# Patient Record
Sex: FEMALE | Race: WHITE | NOT HISPANIC OR LATINO | Employment: UNEMPLOYED | URBAN - METROPOLITAN AREA
[De-identification: names, ages, dates, MRNs, and addresses within clinical notes are randomized per-mention and may not be internally consistent; named-entity substitution may affect disease eponyms.]

---

## 2013-09-20 LAB — HM MAMMOGRAM: NORMAL

## 2013-12-09 LAB — HM PAP SMEAR: NEGATIVE

## 2018-01-18 ENCOUNTER — OFFICE VISIT (OUTPATIENT)
Dept: OBSTETRICS AND GYNECOLOGY | Age: 63
End: 2018-01-18

## 2018-01-18 VITALS
BODY MASS INDEX: 21.85 KG/M2 | WEIGHT: 128 LBS | SYSTOLIC BLOOD PRESSURE: 130 MMHG | HEIGHT: 64 IN | DIASTOLIC BLOOD PRESSURE: 80 MMHG

## 2018-01-18 DIAGNOSIS — Z01.419 WELL WOMAN EXAM WITH ROUTINE GYNECOLOGICAL EXAM: Primary | ICD-10-CM

## 2018-01-18 PROCEDURE — 99386 PREV VISIT NEW AGE 40-64: CPT | Performed by: NURSE PRACTITIONER

## 2018-01-18 RX ORDER — RALOXIFENE HYDROCHLORIDE 60 MG/1
60 TABLET, FILM COATED ORAL
COMMUNITY
End: 2018-01-18

## 2018-01-18 RX ORDER — INDOMETHACIN 25 MG/1
75 CAPSULE ORAL
COMMUNITY
Start: 2017-05-19 | End: 2018-01-18

## 2018-01-18 NOTE — PROGRESS NOTES
[facility]  Routine Annual Visit    2018    Patient: Shelley Woodard          MR#:[id number]      History of Present Illness    62 y.o. female  who presents for annual exam. Last annual with Dr. Ramirez . Pap negative. Pt is postmenopausal- no bleeding. No HRT. She reports negative mammogram this past fall- we have requested the results. Reports colonoscopy at age 52 and is planning her repeat this year. Shelley has no complaints today. Recently started cross fit and is loving it.    No LMP recorded (lmp unknown). Patient is postmenopausal.  Obstetric History:  OB History      Para Term  AB Living    3 3 3   3    SAB TAB Ectopic Multiple Live Births        3         Menstrual History:     No LMP recorded (lmp unknown). Patient is postmenopausal.       Sexual History:       ________________________________________  There is no problem list on file for this patient.      Past Medical History:   Diagnosis Date   • Hx of osteopenia    • Persistent headaches    • Postmenopausal        Past Surgical History:   Procedure Laterality Date   • BREAST LUMPECTOMY Left     BENIGN   • TUBAL ABDOMINAL LIGATION  1990    DR. PEERZ/[facility] (POSTPARTUM)       History   Smoking Status   • Never Smoker   Smokeless Tobacco   • Never Used       Family History   Problem Relation Age of Onset   • Uterine cancer Mother 70     Hysterectomy, Radiation.   • Stroke Mother 82   • Hypertension Mother    • Hyperlipidemia Mother    • Hypertension Father    • Osteoporosis Sister 50   • Hypertension Sister    • Breast cancer Maternal Aunt 60   • Hypertension Brother        Prior to Admission medications    Medication Sig Start Date End Date Taking? Authorizing Provider   Calcium Citrate-Vitamin D (CALCIUM + D PO) Take  by mouth.   Yes Historical Provider, MD   indomethacin (INDOCIN) 25 MG capsule Take 75 mg by mouth. 17 Yes Historical Provider, MD   raloxifene (EVISTA) 60 MG tablet Take 60 mg  "by mouth.  1/18/18  Historical Provider, MD     ________________________________________    Current contraception: post menopausal status  History of abnormal Pap smear: no  Family history of uterine or ovarian cancer: yes - mother- 70s  Family History of colon cancer/colon polyps: no  History of abnormal mammogram: no  History of abnormal lipids: no    The following portions of the patient's history were reviewed and updated as appropriate: allergies, current medications, past family history, past medical history, past social history, past surgical history and problem list.    Review of Systems   Constitutional: Negative.    HENT: Negative.    Eyes: Negative for visual disturbance.   Respiratory: Negative for cough, shortness of breath and wheezing.    Cardiovascular: Negative for chest pain, palpitations and leg swelling.   Gastrointestinal: Negative for abdominal distention, abdominal pain, blood in stool, constipation, diarrhea, nausea and vomiting.   Endocrine: Negative for cold intolerance and heat intolerance.   Genitourinary: Negative for difficulty urinating, dyspareunia, dysuria, frequency, genital sores, hematuria, menstrual problem, pelvic pain, urgency, vaginal bleeding, vaginal discharge and vaginal pain.   Musculoskeletal: Negative.    Skin: Negative.    Neurological: Negative for dizziness, weakness, light-headedness, numbness and headaches.   Hematological: Negative.    Psychiatric/Behavioral: Negative.    Breasts: negative for lumps skin changes, dimpling, swelling, nipple changes/discharge bilaterally         Objective   Physical Exam    /80  Ht 162.6 cm (64\")  Wt 58.1 kg (128 lb)  LMP  (LMP Unknown)  Breastfeeding? No  BMI 21.97 kg/m2   BP Readings from Last 3 Encounters:   01/18/18 130/80      Wt Readings from Last 3 Encounters:   01/18/18 58.1 kg (128 lb)        BMI: Estimated body mass index is 21.97 kg/(m^2) as calculated from the following:    Height as of this encounter: 162.6 cm " "(64\").    Weight as of this encounter: 58.1 kg (128 lb).      General:   alert, appears stated age and cooperative   Heart: regular rate and rhythm, S1, S2 normal, no murmur, click, rub or gallop   Lungs: clear to auscultation bilaterally   Abdomen: soft, non-tender, without masses or organomegaly   Breast: inspection negative, no nipple discharge or bleeding, no masses or nodularity palpable   Vulva: normal   Vagina: normal mucosa, normal discharge   Cervix: no cervical motion tenderness and no lesions   Uterus: normal size, mobile, non-tender or normal shape and consistency   Adnexa: no mass, fullness, tenderness     Assessment:    1. Normal annual exam    2. Healthy lifestyle modifications discussed, counseled on self breast exams and bone health      Plan:    []  Rx:   []  Mammogram request made  [x]  PAP done  []  Occult fecal blood test ([facility])  []  Labs:   []  GC/Chl/TV  []  DEXA scan   []  Referral for colonoscopy:           HERI Cool  1/18/2018 11:22 AM  "

## 2018-01-21 LAB
CYTOLOGIST CVX/VAG CYTO: NORMAL
CYTOLOGY CVX/VAG DOC THIN PREP: NORMAL
DX ICD CODE: NORMAL
HIV 1 & 2 AB SER-IMP: NORMAL
HPV I/H RISK 4 DNA CVX QL PROBE+SIG AMP: NEGATIVE
Lab: NORMAL
OTHER STN SPEC: NORMAL
PATH REPORT.FINAL DX SPEC: NORMAL
STAT OF ADQ CVX/VAG CYTO-IMP: NORMAL

## 2018-01-24 ENCOUNTER — TELEPHONE (OUTPATIENT)
Dept: OBSTETRICS AND GYNECOLOGY | Age: 63
End: 2018-01-24

## 2018-01-24 NOTE — TELEPHONE ENCOUNTER
----- Message from HERI Doe sent at 1/22/2018  1:13 PM EST -----  Please inform patient her pap smear returned negative (normal). Thank you.

## 2019-01-23 ENCOUNTER — OFFICE VISIT (OUTPATIENT)
Dept: OBSTETRICS AND GYNECOLOGY | Age: 64
End: 2019-01-23

## 2019-01-23 VITALS
BODY MASS INDEX: 20.93 KG/M2 | WEIGHT: 122.6 LBS | SYSTOLIC BLOOD PRESSURE: 110 MMHG | HEIGHT: 64 IN | DIASTOLIC BLOOD PRESSURE: 70 MMHG

## 2019-01-23 DIAGNOSIS — Z01.419 WELL WOMAN EXAM WITH ROUTINE GYNECOLOGICAL EXAM: Primary | ICD-10-CM

## 2019-01-23 PROCEDURE — 99396 PREV VISIT EST AGE 40-64: CPT | Performed by: NURSE PRACTITIONER

## 2019-01-23 NOTE — PROGRESS NOTES
[facility]  Routine Annual Visit    2019    Patient: Shelley Woodard          MR#:[id number]      History of Present Illness    63 y.o. female  who presents for annual exam. Last pap negative, HPV negative 2018. Mammogram negative last month per Shelley- calling for results. Pt reports due this year for colonoscopy- does not need referral.  Shelley is postmenopausal- no bleeding. No HRT. Hx osteopenia- on calcium and vitamin D. DEXA last month- monitored by , Dr Woodard. Reports cholesterol done several months ago. No complaints today.    No LMP recorded (lmp unknown). Patient is postmenopausal.  Obstetric History:  OB History      Para Term  AB Living    3 3 3     3    SAB TAB Ectopic Molar Multiple Live Births              3         Menstrual History:     No LMP recorded (lmp unknown). Patient is postmenopausal.       Sexual History:       ________________________________________  There is no problem list on file for this patient.      Past Medical History:   Diagnosis Date   • Hx of osteopenia    • Persistent headaches    • Postmenopausal        Past Surgical History:   Procedure Laterality Date   • BREAST LUMPECTOMY Left     BENIGN   • TUBAL ABDOMINAL LIGATION  1990    DR. PEREZ/[facility] (POSTPARTUM)       Social History     Tobacco Use   Smoking Status Never Smoker   Smokeless Tobacco Never Used       Family History   Problem Relation Age of Onset   • Uterine cancer Mother 70        Hysterectomy, Radiation.   • Stroke Mother 82   • Hypertension Mother    • Hyperlipidemia Mother    • Hypertension Father    • Osteoporosis Sister 50   • Hypertension Sister    • Breast cancer Maternal Aunt 60   • Hypertension Brother        Prior to Admission medications    Medication Sig Start Date End Date Taking? Authorizing Provider   Calcium Citrate-Vitamin D (CALCIUM + D PO) Take  by mouth.   Yes Provider, MD Maurilio     ________________________________________    The  "following portions of the patient's history were reviewed and updated as appropriate: allergies, current medications, past family history, past medical history, past social history, past surgical history and problem list.    Review of Systems   Constitutional: Negative.    HENT: Negative.    Eyes: Negative for visual disturbance.   Respiratory: Negative for cough, shortness of breath and wheezing.    Cardiovascular: Negative for chest pain, palpitations and leg swelling.   Gastrointestinal: Negative for abdominal distention, abdominal pain, blood in stool, constipation, diarrhea, nausea and vomiting.   Endocrine: Negative for cold intolerance and heat intolerance.   Genitourinary: Negative for difficulty urinating, dyspareunia, dysuria, frequency, genital sores, hematuria, menstrual problem, pelvic pain, urgency, vaginal bleeding, vaginal discharge and vaginal pain.   Musculoskeletal: Negative.    Skin: Negative.    Neurological: Negative for dizziness, weakness, light-headedness, numbness and headaches.   Hematological: Negative.    Psychiatric/Behavioral: Negative.    Breasts: negative for lumps skin changes, dimpling, swelling, nipple changes/discharge bilaterally       Objective   Physical Exam    /70   Ht 162.6 cm (64\")   Wt 55.6 kg (122 lb 9.6 oz)   LMP  (LMP Unknown)   Breastfeeding? No   BMI 21.04 kg/m²    BP Readings from Last 3 Encounters:   01/23/19 110/70   01/18/18 130/80      Wt Readings from Last 3 Encounters:   01/23/19 55.6 kg (122 lb 9.6 oz)   01/18/18 58.1 kg (128 lb)        BMI: Estimated body mass index is 21.04 kg/m² as calculated from the following:    Height as of this encounter: 162.6 cm (64\").    Weight as of this encounter: 55.6 kg (122 lb 9.6 oz).            General:   alert, appears stated age and cooperative   Heart: regular rate and rhythm, S1, S2 normal, no murmur, click, rub or gallop   Lungs: clear to auscultation bilaterally   Abdomen: soft, non-tender, without masses or " organomegaly   Breast: inspection negative, no nipple discharge or bleeding, no masses or nodularity palpable   Vulva: normal   Vagina: normal mucosa, normal discharge   Cervix: no cervical motion tenderness and no lesions   Uterus: normal size, mobile, non-tender or normal shape and consistency   Adnexa: no mass, fullness, tenderness     Assessment:    1. Normal annual exam   2. Healthy lifestyle modifications discussed, counseled on self breast exams and bone health      Plan:    []  Rx:   []  Mammogram request made  []  PAP done  []  Occult fecal blood test (Insure)  []  Labs:   []  GC/Chl/TV  []  DEXA scan   []  Referral for colonoscopy:           Carrol Brown, APRN  1/23/2019 10:42 AM

## 2019-01-25 ENCOUNTER — TELEPHONE (OUTPATIENT)
Dept: OBSTETRICS AND GYNECOLOGY | Age: 64
End: 2019-01-25

## 2019-01-25 LAB
CYTOLOGIST CVX/VAG CYTO: NORMAL
CYTOLOGY CVX/VAG DOC THIN PREP: NORMAL
DX ICD CODE: NORMAL
HIV 1 & 2 AB SER-IMP: NORMAL
HPV I/H RISK 4 DNA CVX QL PROBE+SIG AMP: NEGATIVE
OTHER STN SPEC: NORMAL
PATH REPORT.FINAL DX SPEC: NORMAL
STAT OF ADQ CVX/VAG CYTO-IMP: NORMAL

## 2019-01-25 NOTE — TELEPHONE ENCOUNTER
----- Message from HERI Doe sent at 1/25/2019  9:10 AM EST -----  Please inform patient her pap smear returned negative (normal). Thank you.